# Patient Record
Sex: FEMALE | Race: WHITE
[De-identification: names, ages, dates, MRNs, and addresses within clinical notes are randomized per-mention and may not be internally consistent; named-entity substitution may affect disease eponyms.]

---

## 2019-07-22 ENCOUNTER — HOSPITAL ENCOUNTER (EMERGENCY)
Dept: HOSPITAL 56 - MW.ED | Age: 24
LOS: 1 days | Discharge: HOME | End: 2019-07-23
Payer: COMMERCIAL

## 2019-07-22 DIAGNOSIS — R00.0: ICD-10-CM

## 2019-07-22 DIAGNOSIS — N39.0: Primary | ICD-10-CM

## 2019-07-22 PROCEDURE — 87804 INFLUENZA ASSAY W/OPTIC: CPT

## 2019-07-22 PROCEDURE — 87040 BLOOD CULTURE FOR BACTERIA: CPT

## 2019-07-22 PROCEDURE — 85025 COMPLETE CBC W/AUTO DIFF WBC: CPT

## 2019-07-22 PROCEDURE — 83605 ASSAY OF LACTIC ACID: CPT

## 2019-07-22 PROCEDURE — 96366 THER/PROPH/DIAG IV INF ADDON: CPT

## 2019-07-22 PROCEDURE — 94640 AIRWAY INHALATION TREATMENT: CPT

## 2019-07-22 PROCEDURE — 36415 COLL VENOUS BLD VENIPUNCTURE: CPT

## 2019-07-22 PROCEDURE — 87081 CULTURE SCREEN ONLY: CPT

## 2019-07-22 PROCEDURE — 96361 HYDRATE IV INFUSION ADD-ON: CPT

## 2019-07-22 PROCEDURE — A4217 STERILE WATER/SALINE, 500 ML: HCPCS

## 2019-07-22 PROCEDURE — 96365 THER/PROPH/DIAG IV INF INIT: CPT

## 2019-07-22 PROCEDURE — 71046 X-RAY EXAM CHEST 2 VIEWS: CPT

## 2019-07-22 PROCEDURE — 81001 URINALYSIS AUTO W/SCOPE: CPT

## 2019-07-22 PROCEDURE — 81025 URINE PREGNANCY TEST: CPT

## 2019-07-22 PROCEDURE — 87880 STREP A ASSAY W/OPTIC: CPT

## 2019-07-22 PROCEDURE — 99284 EMERGENCY DEPT VISIT MOD MDM: CPT

## 2019-07-22 PROCEDURE — 80053 COMPREHEN METABOLIC PANEL: CPT

## 2019-07-22 NOTE — EDM.PDOC
<Brittany Carlos - Last Filed: 07/23/19 00:04>





ED HPI GENERAL MEDICAL PROBLEM





- General


Chief Complaint: Respiratory Problem


Stated Complaint: PT HAS PNEUMONIA


Time Seen by Provider: 07/22/19 23:41


Source of Information: Reports: Patient


History Limitations: Reports: No Limitations





- History of Present Illness


INITIAL COMMENTS - FREE TEXT/NARRATIVE: 





HISTORY AND PHYSICAL:





History of present illness:


Patient is a 24-year-old female presents to the ED with concern of fever. she 

states that she has been having fever for the past 3 days. She has had a cough 

and feels some pain in her chest.She denies nausea, vomiting, shortness of 

breath, abdominal pain, diarrhea, sore throat, ear pain.





Review of systems: 


As per history of present illness and below otherwise all systems reviewed and 

negative.





Past medical history: 


As per history of present illness and as reviewed below otherwise 

noncontributory.





Surgical history: 


As per history of present illness and as reviewed below otherwise 

noncontributory.





Social history: 


No reported history of drug or alcohol abuse.





Family history: 


As per history of present illness and as reviewed below otherwise 

noncontributory.





Physical exam:


General: Patient sitting comfortably in no acute distress and nontoxic appearing


HEENT: Atraumatic, normocephalic, pupils reactive, negative for conjunctival 

pallor or scleral icterus, mucous membranes moist, throat clear, neck supple, 

nontender, trachea midline. No meningeal signs. 


Lungs: Diminished throughout all lung fields, chest nontender.


Heart: S1S2, regular, negative for clicks, rubs, or overt murmur.


Abdomen: Soft, nondistended, nontender. Negative for masses or 

hepatosplenomegaly. Negative for costovertebral tenderness. No rigidity, rebound

, guarding.


Pelvis: Stable nontender.


Genitourinary: Deferred.


Rectal: Deferred.


Extremities: Atraumatic, negative for cords or calf pain. Neurovascular 

unremarkable.


Neuro: Awake, alert, oriented. Cranial nerves II through XII unremarkable. 

Cerebellum unremarkable. Motor and sensory unremarkable throughout. Exam 

nonfocal.





Notes: 





Diagnostics:


Chest x-ray, CBC, CMP, UA, blood culture x 2 





Therapeutics:


Tylenol 


DuoNeb 





Prescriptions:








Impression: 








Plan:


[]





Definitive disposition and diagnosis as appropriate pending reevaluation and 

review of above.





  ** generalized


Pain Score (Numeric/FACES): 8





- Related Data


 Allergies











Allergy/AdvReac Type Severity Reaction Status Date / Time


 


No Known Allergies Allergy   Verified 07/22/19 23:45











Home Meds: 


 Home Meds





. [No Known Home Meds]  07/22/19 [History]











Past Medical History





- Past Health History


Medical/Surgical History: Denies Medical/Surgical History





- Past Surgical History


HEENT Surgical History: Reports: Oral Surgery (wisdom teeth)





Social & Family History





- Family History


Family Medical History: Noncontributory





- Caffeine Use


Caffeine Use: Reports: Soda





ED ROS GENERAL





- Review of Systems


Review Of Systems: ROS reveals no pertinent complaints other than HPI.





ED EXAM, GENERAL





- Physical Exam


Exam: See Below (see dictation)





Course





- Vital Signs


Last Recorded V/S: 





 Last Vital Signs











Temp  99.7 F   07/23/19 03:05


 


Pulse  113 H  07/23/19 03:05


 


Resp  16   07/23/19 03:05


 


BP  111/72   07/23/19 03:05


 


Pulse Ox  96   07/23/19 03:05














- Orders/Labs/Meds


Orders: 





 Active Orders 24 hr











 Category Date Time Status


 


 RT Aerosol Therapy [RC] ASDIRECTED Care  07/22/19 23:52 Active


 


 CULTURE BLOOD [BC] Stat Lab  07/23/19 00:53 Received


 


 CULTURE BLOOD [BC] Stat Lab  07/23/19 01:02 Received


 


 CULTURE STREP A CONFIRMATION [RM] Stat Lab  07/23/19 00:16 Results


 


 STREP SCRN A RAPID W CULT CONF [RM] Stat Lab  07/23/19 00:16 Results


 


 Sodium Chloride 0.9% [Normal Saline] 1,000 ml Med  07/23/19 01:30 Active





 IV STAT   


 


 Blood Culture x2 Reflex Set [OM.PC] Stat Oth  07/23/19 00:03 Ordered








 Medication Orders





Sodium Chloride (Normal Saline)  1,000 mls @ 125 mls/hr IV STAT SHARIFA


   Last Admin: 07/23/19 01:34  Dose: 125 mls/hr








Labs: 





 Laboratory Tests











  07/23/19 07/23/19 07/23/19 Range/Units





  00:20 00:20 00:20 


 


WBC  7.49    (4.0-11.0)  K/uL


 


RBC  4.69    (4.30-5.90)  M/uL


 


Hgb  14.8    (12.0-16.0)  g/dL


 


Hct  44.3    (36.0-46.0)  %


 


MCV  94.5    (80.0-98.0)  fL


 


MCH  31.6    (27.0-32.0)  pg


 


MCHC  33.4    (31.0-37.0)  g/dL


 


RDW Std Deviation  45.6    (28.0-62.0)  fl


 


RDW Coeff of Yang  13    (11.0-15.0)  %


 


Plt Count  137 L    (150-400)  K/uL


 


MPV  11.20    (7.40-12.00)  fL


 


Neut % (Auto)  72.2    (48.0-80.0)  %


 


Lymph % (Auto)  17.5    (16.0-40.0)  %


 


Mono % (Auto)  10.0    (0.0-15.0)  %


 


Eos % (Auto)  0.0    (0.0-7.0)  %


 


Baso % (Auto)  0.3    (0.0-1.5)  %


 


Neut # (Auto)  5.4    (1.4-5.7)  K/uL


 


Lymph # (Auto)  1.3    (0.6-2.4)  K/uL


 


Mono # (Auto)  0.8    (0.0-0.8)  K/uL


 


Eos # (Auto)  0.0    (0.0-0.7)  K/uL


 


Baso # (Auto)  0.0    (0.0-0.1)  K/uL


 


Nucleated RBC %  0.0    /100WBC


 


Nucleated RBCs #  0    K/uL


 


Lactate    2.0  (0.20-2.00)  mmol/L


 


Sodium   136   (136-145)  mmol/L


 


Potassium   3.4 L   (3.5-5.1)  mmol/L


 


Chloride   100   ()  mmol/L


 


Carbon Dioxide   22.4   (21.0-32.0)  mmol/L


 


BUN   10   (7.0-18.0)  mg/dL


 


Creatinine   0.9   (0.6-1.0)  mg/dL


 


Est Cr Clr Drug Dosing   96.84   mL/min


 


Estimated GFR (MDRD)   > 60.0   ml/min


 


Glucose   123 H   ()  mg/dL


 


Calcium   9.0   (8.5-10.1)  mg/dL


 


Total Bilirubin   0.7   (0.2-1.0)  mg/dL


 


AST   16   (15-37)  IU/L


 


ALT   17   (14-63)  IU/L


 


Alkaline Phosphatase   90   ()  U/L


 


Total Protein   8.1   (6.4-8.2)  g/dL


 


Albumin   4.1   (3.4-5.0)  g/dL


 


Globulin   4.0   (2.6-4.0)  g/dL


 


Albumin/Globulin Ratio   1.0   (0.9-1.6)  


 


Urine Color     


 


Urine Appearance     


 


Urine pH     (5.0-8.0)  


 


Ur Specific Gravity     (1.001-1.035)  


 


Urine Protein     (NEGATIVE)  mg/dL


 


Urine Glucose (UA)     (NEGATIVE)  mg/dL


 


Urine Ketones     (NEGATIVE)  mg/dL


 


Urine Occult Blood     (NEGATIVE)  


 


Urine Nitrite     (NEGATIVE)  


 


Urine Bilirubin     (NEGATIVE)  


 


Urine Ictotest     


 


Urine Urobilinogen     (<2.0)  EU/dL


 


Ur Leukocyte Esterase     (NEGATIVE)  


 


Urine RBC     (0-2/HPF)  


 


Urine WBC     (0-5/HPF)  


 


Ur Epithelial Cells     (NONE-FEW)  


 


Amorphous Sediment     (NEGATIVE)  


 


Urine Bacteria     (NEGATIVE)  


 


Urine Mucus     (NONE-MOD)  


 


Urine HCG, Qual     (NEGATIVE)  














  07/23/19 07/23/19 Range/Units





  00:20 00:20 


 


WBC    (4.0-11.0)  K/uL


 


RBC    (4.30-5.90)  M/uL


 


Hgb    (12.0-16.0)  g/dL


 


Hct    (36.0-46.0)  %


 


MCV    (80.0-98.0)  fL


 


MCH    (27.0-32.0)  pg


 


MCHC    (31.0-37.0)  g/dL


 


RDW Std Deviation    (28.0-62.0)  fl


 


RDW Coeff of Yang    (11.0-15.0)  %


 


Plt Count    (150-400)  K/uL


 


MPV    (7.40-12.00)  fL


 


Neut % (Auto)    (48.0-80.0)  %


 


Lymph % (Auto)    (16.0-40.0)  %


 


Mono % (Auto)    (0.0-15.0)  %


 


Eos % (Auto)    (0.0-7.0)  %


 


Baso % (Auto)    (0.0-1.5)  %


 


Neut # (Auto)    (1.4-5.7)  K/uL


 


Lymph # (Auto)    (0.6-2.4)  K/uL


 


Mono # (Auto)    (0.0-0.8)  K/uL


 


Eos # (Auto)    (0.0-0.7)  K/uL


 


Baso # (Auto)    (0.0-0.1)  K/uL


 


Nucleated RBC %    /100WBC


 


Nucleated RBCs #    K/uL


 


Lactate    (0.20-2.00)  mmol/L


 


Sodium    (136-145)  mmol/L


 


Potassium    (3.5-5.1)  mmol/L


 


Chloride    ()  mmol/L


 


Carbon Dioxide    (21.0-32.0)  mmol/L


 


BUN    (7.0-18.0)  mg/dL


 


Creatinine    (0.6-1.0)  mg/dL


 


Est Cr Clr Drug Dosing    mL/min


 


Estimated GFR (MDRD)    ml/min


 


Glucose    ()  mg/dL


 


Calcium    (8.5-10.1)  mg/dL


 


Total Bilirubin    (0.2-1.0)  mg/dL


 


AST    (15-37)  IU/L


 


ALT    (14-63)  IU/L


 


Alkaline Phosphatase    ()  U/L


 


Total Protein    (6.4-8.2)  g/dL


 


Albumin    (3.4-5.0)  g/dL


 


Globulin    (2.6-4.0)  g/dL


 


Albumin/Globulin Ratio    (0.9-1.6)  


 


Urine Color  YELLOW   


 


Urine Appearance  CLOUDY   


 


Urine pH  7.0   (5.0-8.0)  


 


Ur Specific Gravity  1.020   (1.001-1.035)  


 


Urine Protein  30 H   (NEGATIVE)  mg/dL


 


Urine Glucose (UA)  NEGATIVE   (NEGATIVE)  mg/dL


 


Urine Ketones  40 H   (NEGATIVE)  mg/dL


 


Urine Occult Blood  NEGATIVE   (NEGATIVE)  


 


Urine Nitrite  NEGATIVE   (NEGATIVE)  


 


Urine Bilirubin  SMALL H   (NEGATIVE)  


 


Urine Ictotest  POSITIVE   


 


Urine Urobilinogen  1.0   (<2.0)  EU/dL


 


Ur Leukocyte Esterase  TRACE H   (NEGATIVE)  


 


Urine RBC  NONE SEEN   (0-2/HPF)  


 


Urine WBC  4-6   (0-5/HPF)  


 


Ur Epithelial Cells  MANY   (NONE-FEW)  


 


Amorphous Sediment  LIGHT   (NEGATIVE)  


 


Urine Bacteria  3+ H   (NEGATIVE)  


 


Urine Mucus  LIGHT   (NONE-MOD)  


 


Urine HCG, Qual   NEGATIVE  (NEGATIVE)  











Meds: 





Medications











Generic Name Dose Route Start Last Admin





  Trade Name Freq  PRN Reason Stop Dose Admin


 


Sodium Chloride  1,000 mls @ 125 mls/hr  07/23/19 01:30  07/23/19 01:34





  Normal Saline  IV   125 mls/hr





  STAT SHARIFA   Administration





     





     





     





     














Discontinued Medications














Generic Name Dose Route Start Last Admin





  Trade Name Parish  PRN Reason Stop Dose Admin


 


Acetaminophen  650 mg  07/22/19 23:52  07/23/19 00:04





  Tylenol  PO  07/22/19 23:53  650 mg





  NOW ONE   Administration





     





     





     





     


 


Albuterol/Ipratropium  3 ml  07/22/19 23:52  07/23/19 00:04





  Duoneb 3.0-0.5 Mg/3 Ml  NEB  07/22/19 23:53  3 ml





  ONETIME ONE   Administration





     





     





     





     


 


Sodium Chloride  1,000 mls @ 999 mls/hr  07/23/19 00:06  07/23/19 00:26





  Normal Saline  IV  07/23/19 01:06  999 mls/hr





  STAT ONE   Administration





     





     





     





     


 


Levofloxacin/Dextrose 750 mg/  150 mls @ 100 mls/hr  07/23/19 01:24  07/23/19 01

:34





  Premix  IV  07/23/19 02:53  100 mls/hr





  ONETIME ONE   Administration





     





     





     





     














Departure





- Departure


Disposition: Home, Self-Care 01


Clinical Impression: 


 UTI (urinary tract infection), Fever








- Discharge Information


Referrals: 


PCP,None [Primary Care Provider] - 


Forms:  ED Department Discharge


Additional Instructions: 


Dictation as prescribed


Return if symptoms persist or worsen


Follow-up with primary care in 2 weeks sooner as needed





[facility] - Primary Care


[address] [place] [address]


Phone: [phone]


Fax: [phone]











The following information is given to patients seen in the emergency department 

who are being discharged to home. This information is to outline your options 

for follow-up care. We provide all patients seen in our emergency department 

with a follow-up referral.





The need for follow-up, as well as the timing and circumstances, are variable 

depending upon the specifics of your emergency department visit.





If you don't have a primary care physician on staff, we will provide you with a 

referral. We always advise you to contact your personal physician following an 

emergency department visit to inform them of the circumstance of the visit and 

for follow-up with them and/or the need for any referrals to a consulting 

specialist.





The emergency department will also refer you to a specialist when appropriate. 

This referral assures that you have the opportunity for follow-up care with a 

specialist. All of these measure are taken in an effort to provide you with 

optimal care, which includes your follow-up.





Under all circumstances we always encourage you to contact your private 

physician who remains a resource for coordinating your care. When calling for 

follow-up care, please make the office aware that this follow-up is from your 

recent emergency room visit. If for any reason you are refused follow-up, 

please contact the [facility] emergency department at [phone] 

and asked to speak to the emergency department charge nurse.








- My Orders


Last 24 Hours: 





My Active Orders





07/23/19 00:03


Blood Culture x2 Reflex Set [OM.PC] Stat 





07/23/19 00:16


CULTURE STREP A CONFIRMATION [RM] Stat 


STREP SCRN A RAPID W CULT CONF [RM] Stat 





07/23/19 00:53


CULTURE BLOOD [BC] Stat 





07/23/19 01:02


CULTURE BLOOD [BC] Stat 





07/23/19 01:30


Sodium Chloride 0.9% [Normal Saline] 1,000 ml IV STAT 














- Assessment/Plan


Last 24 Hours: 





My Active Orders





07/23/19 00:03


Blood Culture x2 Reflex Set [OM.PC] Stat 





07/23/19 00:16


CULTURE STREP A CONFIRMATION [RM] Stat 


STREP SCRN A RAPID W CULT CONF [RM] Stat 





07/23/19 00:53


CULTURE BLOOD [BC] Stat 





07/23/19 01:02


CULTURE BLOOD [BC] Stat 





07/23/19 01:30


Sodium Chloride 0.9% [Normal Saline] 1,000 ml IV STAT 














<Leighton Baldwin - Last Filed: 07/23/19 03:38>





ED HPI GENERAL MEDICAL PROBLEM





- History of Present Illness


INITIAL COMMENTS - FREE TEXT/NARRATIVE: 





Patient presents with fever as above


I've seen and examined the patient





HEENT grossly within normal limits


Chest clear


CV regular


Abdomen benign


Extremities four-inch motion strength 5 out of 5 no edema


CNS alert nonfocal





Lab as below


Lactic, 


Chest 1 view





Therapeutics


 Levaquin 750 mg IV


Levaquin 500 milligrams by mouth daily #10 no refill





Impression


UTI


Fever resolved


Tachycardia improved





Definitive disposition and diagnosis as appropriate pending reevaluation and 

review of above





l














ED ROS GENERAL





- Review of Systems


Review Of Systems: See Below





ED EXAM, GENERAL





- Physical Exam


Exam: See Below





Departure





- Departure


Time of Disposition: 03:37


Condition: Good

## 2019-07-23 LAB
CHLORIDE SERPL-SCNC: 100 MMOL/L (ref 98–107)
SODIUM SERPL-SCNC: 136 MMOL/L (ref 136–145)

## 2019-07-23 NOTE — CR
INDICATION:   shortness of breath.  



CHEST, PA AND LATERAL



Upright PA and lateral radiographs of the chest were performed.



Comparison:  No previous studies are currently available for comparison.



The lungs appear clear and there are no pleural effusions.  Heart size and 

pulmonary vasculature appear normal.  Visualized bones show no significant 

findings.



IMPRESSION:  No acute intrathoracic abnormality identified.



JUVENAL PINEDA MD



[facility]



Dictated by: Jacobo Pineda MD @ 07/23/2019 00:12:13



(Electronically Signed)

## 2022-07-06 ENCOUNTER — HOSPITAL ENCOUNTER (INPATIENT)
Dept: HOSPITAL 56 - MW.OBCHECK | Age: 27
LOS: 1 days | Discharge: HOME | DRG: 560 | End: 2022-07-07
Attending: OBSTETRICS & GYNECOLOGY | Admitting: OBSTETRICS & GYNECOLOGY
Payer: COMMERCIAL

## 2022-07-06 DIAGNOSIS — Z3A.39: ICD-10-CM

## 2022-07-06 DIAGNOSIS — Z20.822: ICD-10-CM

## 2022-07-06 PROCEDURE — 10907ZC DRAINAGE OF AMNIOTIC FLUID, THERAPEUTIC FROM PRODUCTS OF CONCEPTION, VIA NATURAL OR ARTIFICIAL OPENING: ICD-10-PCS | Performed by: OBSTETRICS & GYNECOLOGY

## 2022-07-06 PROCEDURE — 0UQMXZZ REPAIR VULVA, EXTERNAL APPROACH: ICD-10-PCS | Performed by: OBSTETRICS & GYNECOLOGY

## 2022-07-06 PROCEDURE — 3E0R3BZ INTRODUCTION OF ANESTHETIC AGENT INTO SPINAL CANAL, PERCUTANEOUS APPROACH: ICD-10-PCS | Performed by: ANESTHESIOLOGY

## 2022-07-06 PROCEDURE — U0002 COVID-19 LAB TEST NON-CDC: HCPCS

## 2022-07-06 PROCEDURE — 00HU33Z INSERTION OF INFUSION DEVICE INTO SPINAL CANAL, PERCUTANEOUS APPROACH: ICD-10-PCS | Performed by: ANESTHESIOLOGY
